# Patient Record
Sex: FEMALE | Race: WHITE | Employment: FULL TIME | URBAN - NONMETROPOLITAN AREA
[De-identification: names, ages, dates, MRNs, and addresses within clinical notes are randomized per-mention and may not be internally consistent; named-entity substitution may affect disease eponyms.]

---

## 2017-09-21 ENCOUNTER — OFFICE VISIT (OUTPATIENT)
Dept: OPTOMETRY | Age: 52
End: 2017-09-21
Payer: COMMERCIAL

## 2017-09-21 DIAGNOSIS — H52.203 MYOPIA OF BOTH EYES WITH ASTIGMATISM AND PRESBYOPIA: Primary | ICD-10-CM

## 2017-09-21 DIAGNOSIS — H52.13 MYOPIA OF BOTH EYES WITH ASTIGMATISM AND PRESBYOPIA: Primary | ICD-10-CM

## 2017-09-21 DIAGNOSIS — H52.4 MYOPIA OF BOTH EYES WITH ASTIGMATISM AND PRESBYOPIA: Primary | ICD-10-CM

## 2017-09-21 PROCEDURE — 92004 COMPRE OPH EXAM NEW PT 1/>: CPT | Performed by: OPTOMETRIST

## 2017-09-21 RX ORDER — BENOXINATE HCL/FLUORESCEIN SOD 0.4%-0.25%
1 DROPS OPHTHALMIC (EYE) ONCE
Status: COMPLETED | OUTPATIENT
Start: 2017-09-21 | End: 2017-09-21

## 2017-09-21 RX ADMIN — Medication 1 DROP: at 16:30

## 2017-09-21 ASSESSMENT — KERATOMETRY
OD_K2POWER_DIOPTERS: 45.00
OD_K1POWER_DIOPTERS: 43.00
OD_AXISANGLE2_DEGREES: 175
OS_AXISANGLE2_DEGREES: 179
OS_K2POWER_DIOPTERS: 45.75
OD_AXISANGLE_DEGREES: 085
OS_AXISANGLE_DEGREES: 089
OS_K1POWER_DIOPTERS: 43.50

## 2017-09-21 ASSESSMENT — REFRACTION_MANIFEST
OD_AXIS: 176
OS_AXIS: 176
OS_SPHERE: -3.00
OS_CYLINDER: -2.00
OS_ADD: +2.00
OD_CYLINDER: -1.75
OD_ADD: +2.00
OD_SPHERE: -2.75

## 2017-09-21 ASSESSMENT — SLIT LAMP EXAM - LIDS
COMMENTS: NORMAL
COMMENTS: NORMAL

## 2017-09-21 ASSESSMENT — REFRACTION_WEARINGRX
OS_CYLINDER: -2.25
OS_AXIS: 180
SPECS_TYPE: PAL
OD_SPHERE: -3.00
OS_ADD: +1.75
OD_ADD: +1.75
OD_CYLINDER: -1.75
OS_SPHERE: -3.00
OD_AXIS: 171

## 2017-09-21 ASSESSMENT — VISUAL ACUITY
CORRECTION_TYPE: GLASSES
METHOD: SNELLEN - LINEAR
OD_CC: 20/30OU
OS_CC: 20/20

## 2017-09-21 ASSESSMENT — TONOMETRY
OS_IOP_MMHG: 16
IOP_METHOD: APPLANATION W FLURESS DROP
OD_IOP_MMHG: 18

## 2019-05-07 ENCOUNTER — OFFICE VISIT (OUTPATIENT)
Dept: OPTOMETRY | Age: 54
End: 2019-05-07
Payer: COMMERCIAL

## 2019-05-07 DIAGNOSIS — H52.203 MYOPIA OF BOTH EYES WITH ASTIGMATISM: Primary | ICD-10-CM

## 2019-05-07 DIAGNOSIS — H52.13 MYOPIA OF BOTH EYES WITH ASTIGMATISM: Primary | ICD-10-CM

## 2019-05-07 DIAGNOSIS — H53.8 BLURRED VISION, LEFT EYE: ICD-10-CM

## 2019-05-07 PROCEDURE — 92014 COMPRE OPH EXAM EST PT 1/>: CPT | Performed by: OPTOMETRIST

## 2019-05-07 ASSESSMENT — REFRACTION_WEARINGRX
OS_CYLINDER: -2.00
OD_CYLINDER: -1.75
OS_SPHERE: -3.00
SPECS_TYPE: PAL
OS_ADD: +2.00
OS_AXIS: 176
OD_AXIS: 176
OD_SPHERE: -2.75
OD_ADD: +2.00

## 2019-05-07 ASSESSMENT — TONOMETRY
IOP_METHOD: NON-CONTACT AIR PUFF
OD_IOP_MMHG: 14
OS_IOP_MMHG: 11

## 2019-05-07 ASSESSMENT — REFRACTION_MANIFEST
OD_SPHERE: -2.25
OD_AXIS: 176
OD_CYLINDER: -1.75
OS_CYLINDER: -2.25
OD_ADD: +2.00
OS_SPHERE: -2.50
OS_AXIS: 180
OS_ADD: +2.00

## 2019-05-07 ASSESSMENT — VISUAL ACUITY
OD_CC: 20/25 OU
METHOD: SNELLEN - LINEAR
OS_CC: 20/30
CORRECTION_TYPE: GLASSES

## 2019-05-07 ASSESSMENT — KERATOMETRY
OD_K2POWER_DIOPTERS: 45.25
OD_K1POWER_DIOPTERS: 43.25
OD_AXISANGLE_DEGREES: 085
OD_AXISANGLE2_DEGREES: 175

## 2019-05-07 ASSESSMENT — SLIT LAMP EXAM - LIDS
COMMENTS: NORMAL
COMMENTS: NORMAL

## 2019-05-07 NOTE — PROGRESS NOTES
No pertinent past medical history. Main Ophthalmology Exam     External Exam       Right Left    External Normal Normal          Slit Lamp Exam       Right Left    Lids/Lashes Normal Normal    Conjunctiva/Sclera White and quiet White and quiet    Cornea Clear Clear    Anterior Chamber Deep and quiet Deep and quiet    Iris Round and reactive Round and reactive    Lens Trace Nuclear sclerosis Trace Nuclear sclerosis    Vitreous Normal Normal          Fundus Exam       Right Left    Disc Normal Normal    C/D Ratio 0.35 0.35    Macula Normal; mottling  Normal; maybe slight mottling     Vessels Normal Normal                   Tonometry     Tonometry (Non-contact air puff, 4:11 PM)       Right Left    Pressure 14 11   IOP.3             10.6  CH:  10.5          10.9  WS: 6.2          4.6                  Not recorded         Not recorded          Visual Acuity (Snellen - Linear)       Right Left    Dist cc 20/20 20/30    Near cc 20/25 OU     Correction:  Glasses          Pupils     Pupils       Pupils    Right PERRL    Left PERRL              Neuro/Psych     Neuro/Psych     Oriented x3:  Yes    Mood/Affect:  Normal              Keratometry     Keratometry       K1 Axis K2 Axis    Right 43.25 175 45.25 085    Left                      Ophthalmology Exam     Wearing Rx       Sphere Cylinder Axis Add    Right -2.75 -1.75 176 +2.00    Left -3.00 -2.00 176 +2.00    Age:  4yrs    Type:  PAL              Manifest Refraction     Manifest Refraction       Sphere Cylinder Axis Dist VA Add    Right -2.25 -1.75 176 20/20 +2.00    Left -2.50 -2.25 180 20/25--- +2.00          Manifest Refraction #2 (Auto)       Sphere Cylinder Axis Dist VA Add    Right -2.25 -1.50 173      Left -2.00 0.00 180                 Final Rx       Sphere Cylinder Axis Add    Right -2.25 -1.75 176 +2.00    Left -2.50 -2.25 180 +2.00    Type:  PAL; do not fill     Expiration Date:  2021            1. Myopia of both eyes with astigmatism    2.

## 2019-05-22 ENCOUNTER — OFFICE VISIT (OUTPATIENT)
Dept: OPTOMETRY | Age: 54
End: 2019-05-22
Payer: COMMERCIAL

## 2019-05-22 DIAGNOSIS — H53.8 BLURRED VISION, BILATERAL: Primary | ICD-10-CM

## 2019-05-22 DIAGNOSIS — H52.4 MYOPIA OF BOTH EYES WITH ASTIGMATISM AND PRESBYOPIA: ICD-10-CM

## 2019-05-22 DIAGNOSIS — H25.13 NUCLEAR SCLEROSIS OF BOTH EYES: ICD-10-CM

## 2019-05-22 DIAGNOSIS — H52.203 MYOPIA OF BOTH EYES WITH ASTIGMATISM AND PRESBYOPIA: ICD-10-CM

## 2019-05-22 DIAGNOSIS — H52.13 MYOPIA OF BOTH EYES WITH ASTIGMATISM AND PRESBYOPIA: ICD-10-CM

## 2019-05-22 PROCEDURE — 99212 OFFICE O/P EST SF 10 MIN: CPT | Performed by: OPTOMETRIST

## 2019-05-22 PROCEDURE — 92134 CPTRZ OPH DX IMG PST SGM RTA: CPT | Performed by: OPTOMETRIST

## 2019-05-22 RX ORDER — PHENYLEPHRINE HCL 2.5 %
1 DROPS OPHTHALMIC (EYE) ONCE
Status: COMPLETED | OUTPATIENT
Start: 2019-05-22 | End: 2019-05-22

## 2019-05-22 RX ORDER — TROPICAMIDE 10 MG/ML
1 SOLUTION/ DROPS OPHTHALMIC ONCE
Status: COMPLETED | OUTPATIENT
Start: 2019-05-22 | End: 2019-05-22

## 2019-05-22 RX ADMIN — Medication 1 DROP: at 16:31

## 2019-05-22 RX ADMIN — TROPICAMIDE 1 DROP: 10 SOLUTION/ DROPS OPHTHALMIC at 16:31

## 2019-05-22 ASSESSMENT — REFRACTION_MANIFEST
OS_ADD: +2.00
OS_AXIS: 180
OD_CYLINDER: -1.75
OD_SPHERE: -2.25
OD_ADD: +2.00
OD_AXIS: 176
OS_CYLINDER: -2.25
OS_SPHERE: -2.50

## 2019-05-22 ASSESSMENT — TONOMETRY
IOP_METHOD: NON-CONTACT AIR PUFF
OD_IOP_MMHG: 14
OS_IOP_MMHG: 17

## 2019-05-22 ASSESSMENT — REFRACTION_WEARINGRX
OS_CYLINDER: -2.00
OD_AXIS: 176
OS_AXIS: 176
OD_SPHERE: -2.75
OD_CYLINDER: -1.75
OD_ADD: +2.00
OS_SPHERE: -3.00
SPECS_TYPE: PAL
OS_ADD: +2.00

## 2019-05-22 ASSESSMENT — VISUAL ACUITY
METHOD: SNELLEN - LINEAR
OS_CC: 20/30
CORRECTION_TYPE: GLASSES
OS_CC+: -1

## 2019-05-22 ASSESSMENT — SLIT LAMP EXAM - LIDS
COMMENTS: NORMAL
COMMENTS: NORMAL

## 2019-05-22 NOTE — PROGRESS NOTES
Harmony Amayadanae Merida presents today for   Chief Complaint   Patient presents with    2 Week Follow-Up   . HPI     VA/D and oct         No current outpatient medications on file. Current Facility-Administered Medications   Medication Dose Route Frequency Provider Last Rate Last Dose    phenylephrine (MYDFRIN) 2.5 % ophthalmic solution 1 drop  1 drop Both Eyes Once Tila Alarcon, OD        tropicamide (MYDRIACYL) 1 % ophthalmic solution 1 drop  1 drop Both Eyes Once Tila Tam, OD             Family History   Problem Relation Age of Onset    Diabetes Paternal Grandmother     Cataracts Neg Hx     Glaucoma Neg Hx      Social History     Socioeconomic History    Marital status:      Spouse name: None    Number of children: None    Years of education: None    Highest education level: None   Occupational History    None   Social Needs    Financial resource strain: None    Food insecurity:     Worry: None     Inability: None    Transportation needs:     Medical: None     Non-medical: None   Tobacco Use    Smoking status: Never Smoker    Smokeless tobacco: Never Used   Substance and Sexual Activity    Alcohol use: None    Drug use: None    Sexual activity: None   Lifestyle    Physical activity:     Days per week: None     Minutes per session: None    Stress: None   Relationships    Social connections:     Talks on phone: None     Gets together: None     Attends Restorationist service: None     Active member of club or organization: None     Attends meetings of clubs or organizations: None     Relationship status: None    Intimate partner violence:     Fear of current or ex partner: None     Emotionally abused: None     Physically abused: None     Forced sexual activity: None   Other Topics Concern    None   Social History Narrative    None     History reviewed. No pertinent past medical history.         Main Ophthalmology Exam     External Exam       Right Left    External Normal Normal Slit Lamp Exam       Right Left    Lids/Lashes Normal Normal    Conjunctiva/Sclera White and quiet White and quiet    Cornea Clear Clear    Anterior Chamber Deep and quiet Deep and quiet    Iris Round and reactive Round and reactive    Lens Trace Nuclear sclerosis Trace Nuclear sclerosis    Vitreous Normal Normal          Fundus Exam       Right Left    Disc Normal Normal    C/D Ratio 0.35 0.35    Macula Normal Normal    Vessels Normal Normal                   Tonometry     Tonometry (Non-contact air puff, 3:33 PM)       Right Left    Pressure 14 17   IOP.3             13.9  CH:  11.0          8.0  WS: 6.6          3.2                  Not recorded         Not recorded          Visual Acuity (Snellen - Linear)       Right Left    Dist cc 20/25 20/30 -1    Correction:  Glasses           Not recorded         Not recorded         Not recorded            Ophthalmology Exam     Wearing Rx       Sphere Cylinder Axis Add    Right -2.75 -1.75 176 +2.00    Left -3.00 -2.00 176 +2.00    Type:  PAL              Manifest Refraction     Manifest Refraction       Sphere Cylinder Axis Add    Right -2.25 -1.75 176 +2.00    Left -2.50 -2.25 180 +2.00               Final Rx       Sphere Cylinder Axis Add    Right -2.25 -1.75 176 +2.00    Left -2.50 -2.25 180 +2.00            1. Blurred vision, bilateral    2. Myopia of both eyes with astigmatism and presbyopia    3.  Nuclear sclerosis of both eyes; mild            Patient Instructions   New glasses recommended; no abnormalities were found on the scan performed today;  Return in 6 months for a complete exam and to reevaluation the vision and see if any changes are needed or if the eyes are still bothersome      Return in about 6 months (around 2019) for complete eye exam.